# Patient Record
Sex: MALE | Race: WHITE | NOT HISPANIC OR LATINO | URBAN - METROPOLITAN AREA
[De-identification: names, ages, dates, MRNs, and addresses within clinical notes are randomized per-mention and may not be internally consistent; named-entity substitution may affect disease eponyms.]

---

## 2020-12-08 ENCOUNTER — TRANSFERRED RECORDS (OUTPATIENT)
Dept: HEALTH INFORMATION MANAGEMENT | Facility: CLINIC | Age: 30
End: 2020-12-08

## 2020-12-11 ENCOUNTER — TRANSCRIBE ORDERS (OUTPATIENT)
Dept: OTHER | Age: 30
End: 2020-12-11

## 2020-12-11 DIAGNOSIS — H51.9 ABNORMAL EYE MOVEMENTS: Primary | ICD-10-CM

## 2021-01-06 ENCOUNTER — OFFICE VISIT (OUTPATIENT)
Dept: OPHTHALMOLOGY | Facility: CLINIC | Age: 31
End: 2021-01-06
Attending: OPHTHALMOLOGY

## 2021-01-06 DIAGNOSIS — H53.2 DOUBLE VISION: ICD-10-CM

## 2021-01-06 DIAGNOSIS — H51.9 ABNORMAL EYE MOVEMENTS: ICD-10-CM

## 2021-01-06 DIAGNOSIS — H53.10 SUBJECTIVE VISUAL DISTURBANCE: ICD-10-CM

## 2021-01-06 PROCEDURE — 92083 EXTENDED VISUAL FIELD XM: CPT | Performed by: OPHTHALMOLOGY

## 2021-01-06 PROCEDURE — 99204 OFFICE O/P NEW MOD 45 MIN: CPT | Performed by: OPHTHALMOLOGY

## 2021-01-06 PROCEDURE — G0463 HOSPITAL OUTPT CLINIC VISIT: HCPCS | Mod: 25

## 2021-01-06 RX ORDER — CITALOPRAM HYDROBROMIDE 20 MG/1
20 TABLET ORAL
COMMUNITY
Start: 2020-07-10

## 2021-01-06 RX ORDER — LOSARTAN POTASSIUM 100 MG/1
100 TABLET ORAL
COMMUNITY
Start: 2020-12-04 | End: 2021-12-04

## 2021-01-06 RX ORDER — LORATADINE 10 MG/1
10 CAPSULE, LIQUID FILLED ORAL
COMMUNITY

## 2021-01-06 RX ORDER — OMEGA-3 FATTY ACIDS/FISH OIL 300-1000MG
600 CAPSULE ORAL
COMMUNITY

## 2021-01-06 ASSESSMENT — VISUAL ACUITY
CORRECTION_TYPE: CONTACTS
METHOD: SNELLEN - LINEAR
OD_CC: 20/20
OS_CC: 20/20

## 2021-01-06 ASSESSMENT — SLIT LAMP EXAM - LIDS
COMMENTS: NORMAL
COMMENTS: NORMAL

## 2021-01-06 ASSESSMENT — TONOMETRY
OS_IOP_MMHG: 12
OD_IOP_MMHG: 14
IOP_METHOD: ICARE

## 2021-01-06 ASSESSMENT — CUP TO DISC RATIO
OS_RATIO: 0.1
OD_RATIO: 0.1

## 2021-01-06 ASSESSMENT — EXTERNAL EXAM - LEFT EYE: OS_EXAM: NORMAL

## 2021-01-06 ASSESSMENT — EXTERNAL EXAM - RIGHT EYE: OD_EXAM: NORMAL

## 2021-01-06 ASSESSMENT — CONF VISUAL FIELD
OS_NORMAL: 1
OD_NORMAL: 1

## 2021-01-06 NOTE — LETTER
2021    RE: Billy Fontaine  : 1990  MRN: [id number]    Dear Dr. Dickey    Thank you for referring your patient, Billy Fontaine, to my neuro-ophthalmology clinic recently.  After a thorough neuro-ophthalmic history and examination, I came to the following conclusions:     1. Subjective visual disturbance with associated disequilibrium often triggered by artificial lighting.  Today I find no exam correlate to explain his symptoms.  He reports to me that today was a very good day for him and he was not experiencing the visual disturbance.  I have instructed him to return to see me when he is in the midst of a bad episode to see if there are any exam correlates at that time.  Based upon the normal examination and episodic nature triggered by artificial lighting I am most suspicious for vestibular migraines without headache as the etiology however the challenging aspect of this diagnosis is this is a clinical diagnosis with no real confirmatory test in the patient does not have a personal or family history of migraine headache disorder.      The patient reports to me that he has seen a neurologist in the past who told him similarly that he felt the patient had vestibular migraines.  He recommended topiramate although the patient is not certain of the dose.  The patient decided not to take the medication because he was concerned about side effects.  I encouraged the patient to trial topiramate.  I personally would start the patient on 25 mg twice a day but if he tolerated it and symptoms persisted I would not hesitate to increase to 50 mg twice a day.    Patient will follow up with me in the midst of a symptomatic episode    Pt. States he notes in March that he has hard a hard time focusing at distance he hasn't been able to work since May that occurs intermittently. He says that when he walks into a store with bright lighting that he gets really lightheaded and dizzy, he feels that he will fall over,  "this started in May as well. Using sunglasses helped his symptoms. He has been wearing prism to his glasses since July, this has helped some of his symptoms. He says that when he is driving he constantly needs to be moving his eyes around otherwise his eyes will get 'fixed' on one spot. He notes that when he wants to look at a target it takes his eyes some times to reach the target.    Patient was prescribed prism glasses by Dr. Hernandez.      Patient states that artificial lights seems to trigger events.    Patient has no history of headaches. Not prone to motion sickness.      Patient states that his symptoms are highly episodic. He felt normal over last 2 days and driving but other times feels \"awful for days.\"     The patient is presenting with an acute illness that potentially poses a threat to bodily function (vision).    PMH: HTN  POH: none  FH:none- no family history of migraines.   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    Meds: citalopram, propranolol and losartan- these were started back in May 2020.  Patient reports his symptoms started before going on these medications.    The patient has normal afferent visual function in both eyes including normal best corrected visual acuity, color vision, confrontation visual fields, and pupillary light responses in both eyes.  his structural eye exam including slit lamp exam and dilated fundus exam was normal in both eyes. Cranial nerves V1-3, 7,8,9,11, and 12 were normal bilaterally.     Sensorimotor testing performed today showed a relatively comitant 2-4 prism diopter exophoria in all gaze positions at distance and that remained 4 prism diopters at near.  There was no nystagmus in any gaze position.  Saccades were normal and smooth pursuit was normal.  unable to elicit nystagmus with provocative maneuvers including vigorous head shaking, humming, and forceful Valsalva.    MRI brain WO contrast (10/12/2020)  IMPRESSION:  1.  No mass, hemorrhage, " or recent infarct identified.  2.  Mild inflammatory changes of the paranasal sinuses    I obtained and reviewed these images today and agree with the interpretation.  Specifically I did not see any brainstem or cerebellar pathology.  Likewise I did not see any indication of demyelinating disease on FLAIR sequences.    MR cervical spine (8/7/2020):  IMPRESSION:  1.  Mild disc degeneration.  2.  Uncinate spurring causes moderate narrowing of the left neural foramen at C4-C5. This may be affecting the C5 nerve root.  3.  No central spinal stenosis.    Normal head CT scan 5/31/2020    Testing performed today included the following:    Automated kinetic visual fields which were full and reliable in both eyes.        Again, thank you for trusting me with the care of your patient.  For further exam details, please feel free to contact our office for additional records.  If you wish to contact me regarding this patient please email me at [facility] [email or web address] or give my clinic a call to arrange a phone conversation.    Sincerely,    Brenton Rebolledo MD  , Neuro-Ophthalmology and Adult Strabismus Surgery  [facility] in Neuro-Ophthalmology  Department of Ophthalmology and Visual Neurosciences  [facility]    DX: possible vestibular migraine without headache

## 2021-01-06 NOTE — PROGRESS NOTES
1. Subjective visual disturbance with associated disequilibrium often triggered by artificial lighting.  Today I find no exam correlate to explain his symptoms.  He reports to me that today was a very good day for him and he was not experiencing the visual disturbance.  I have instructed him to return to see me when he is in the midst of a bad episode to see if there are any exam correlates at that time.  Based upon the normal examination and episodic nature triggered by artificial lighting I am most suspicious for vestibular migraines without headache as the etiology however the challenging aspect of this diagnosis is this is a clinical diagnosis with no real confirmatory test in the patient does not have a personal or family history of migraine headache disorder.      The patient reports to me that he has seen a neurologist in the past who told him similarly that he felt the patient had vestibular migraines.  He recommended topiramate although the patient is not certain of the dose.  The patient decided not to take the medication because he was concerned about side effects.  I encouraged the patient to trial topiramate.  I personally would start the patient on 25 mg twice a day but if he tolerated it and symptoms persisted I would not hesitate to increase to 50 mg twice a day.    Patient will follow up with me in the midst of a symptomatic episode    Pt. States he notes in March that he has hard a hard time focusing at distance he hasn't been able to work since May that occurs intermittently. He says that when he walks into a store with bright lighting that he gets really lightheaded and dizzy, he feels that he will fall over, this started in May as well. Using sunglasses helped his symptoms. He has been wearing prism to his glasses since July, this has helped some of his symptoms. He says that when he is driving he constantly needs to be moving his eyes around otherwise his eyes will get 'fixed' on one spot.  "He notes that when he wants to look at a target it takes his eyes some times to reach the target.    Patient was prescribed prism glasses by Dr. Hernandez.      Patient states that artificial lights seems to trigger events.    Patient has no history of headaches. Not prone to motion sickness.      Patient states that his symptoms are highly episodic. He felt normal over last 2 days and driving but other times feels \"awful for days.\"     The patient is presenting with an acute illness that potentially poses a threat to bodily function (vision).    PMH: HTN  POH: none  FH:none- no family history of migraines.   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    Meds: citalopram, propranolol and losartan- these were started back in May 2020.  Patient reports his symptoms started before going on these medications.    The patient has normal afferent visual function in both eyes including normal best corrected visual acuity, color vision, confrontation visual fields, and pupillary light responses in both eyes.  his structural eye exam including slit lamp exam and dilated fundus exam was normal in both eyes. Cranial nerves V1-3, 7,8,9,11, and 12 were normal bilaterally.     Sensorimotor testing performed today showed a relatively comitant 2-4 prism diopter exophoria in all gaze positions at distance and that remained 4 prism diopters at near.  There was no nystagmus in any gaze position.  Saccades were normal and smooth pursuit was normal.  unable to elicit nystagmus with provocative maneuvers including vigorous head shaking, humming, and forceful Valsalva.    MRI brain WO contrast (10/12/2020)  IMPRESSION:  1.  No mass, hemorrhage, or recent infarct identified.  2.  Mild inflammatory changes of the paranasal sinuses    I obtained and reviewed these images today and agree with the interpretation.  Specifically I did not see any brainstem or cerebellar pathology.  Likewise I did not see any indication of " demyelinating disease on FLAIR sequences.    MR cervical spine (8/7/2020):  IMPRESSION:  1.  Mild disc degeneration.  2.  Uncinate spurring causes moderate narrowing of the left neural foramen at C4-C5. This may be affecting the C5 nerve root.  3.  No central spinal stenosis.    Normal head CT scan 5/31/2020    Testing performed today included the following:    Automated kinetic visual fields which were full and reliable in both eyes.         Complete documentation of historical and exam elements from today's encounter can be found in the full encounter summary report (not reduplicated in this progress note).  I personally obtained the chief complaint(s) and history of present illness.  I confirmed and edited as necessary the review of systems, past medical/surgical history, family history, social history, and examination findings as documented by others; and I examined the patient myself.  I personally reviewed the relevant tests, images, and reports as documented above.  I formulated and edited as necessary the assessment and plan and discussed the findings and management plan with the patient and family     Brenton Rebolledo MD

## 2021-01-06 NOTE — NURSING NOTE
Chief Complaints and History of Present Illnesses   Patient presents with     Blurred Vision Follow-Up     Chief Complaint(s) and History of Present Illness(es)     Blurred Vision Follow-Up               Comments     Billy Fontaine is a 30 year old male who presents today for    1. Subjective visual disturbance  C/o episodes of vertigo and lightheadedness. Onset march 2020. No precipitating event. Woke up with it one day, and got progressively worse. Wearing sunglasses and that helps the symptoms, but does not alleviate.   When driving, hard to stay focused on driving. Has a hard time moving his eyes at times. When he looks at th rearview mirror, has a hard time shifting eyes back to primary position.       Roseann WHITEHEAD 1:28 PM January 6, 2021

## 2021-01-13 ENCOUNTER — TELEPHONE (OUTPATIENT)
Dept: OPHTHALMOLOGY | Facility: CLINIC | Age: 31
End: 2021-01-13

## 2021-01-13 NOTE — TELEPHONE ENCOUNTER
Called patient back to let him know that Dr. Rebolledo stated that if he can't tolerate the topiramate because of the headaches, the he should stop it. If he can' tolerate it and it helps with his other symptoms, then he should keep taking it. Per Dr. Lucas, he doesn't believe the headaches are dangerous, it's a question of whether the medication helps with his symptoms enough to put up with the headaches.     Patient wants to know if the headaches will eventually go away after using the medication for a period of time. He states that even though he's only been on the medication for almost 1 week, his other symptoms do seem to be getting better. The headaches are tolerable. However, he does not want to have headaches for the rest of his life if he is to take this medication on going      ROBERT MCDANIEL COT on 1/13/2021 at 2:08 PM

## 2021-01-13 NOTE — TELEPHONE ENCOUNTER
Patient called and left a message for a call back.  Returned patient's phone call back. Patient informed me that he has some disability forms for Dr. Rebolledo to fill out since he was the provider who was able to diagnose him. He will fax it over today or tomorrow.     Patient also had a question about the topiramate medication Dr. Rebolledo had recommended him to take. Patient started it on Thursday, 1/7/21 and since taking it, he's had a constant headache all day from the time he wakes up to when he goes to sleep. He also takes citalopram, losartan, and propranolol. He has not had any side effects with those medications. It only started since taking the topiramate. Therefore, patient is wondering if taking the topirmate with the other medications could be causing the headaches or is it the medication itself. Does he need to see his PCP for this?     ROBERT MCDANIEL COT on 1/13/2021 at 1:47 PM

## 2021-01-14 NOTE — TELEPHONE ENCOUNTER
Left message for patient about his question on headaches. I informed him that the the headaches would unlikely be long term but impossible to say with certainty.    ROBERT MCDANIEL COT on 1/14/2021 at 8:20 AM

## 2021-01-19 ENCOUNTER — TELEPHONE (OUTPATIENT)
Dept: OPHTHALMOLOGY | Facility: CLINIC | Age: 31
End: 2021-01-19

## 2021-01-19 NOTE — TELEPHONE ENCOUNTER
Patient called to ask me if he can email over his disbality forms to me. He states he tried to fax it over but it didn't work. I informed patient that the e-mail is not secure. We can have him re-fax it over to me once we verify the fax number, he can mail it to us or drop it off in person. I provided P[facility]'s fax number to patient. He said he will fax it over again and if it doesn't work; he still in and drop it off.     ROBERT MCDANIEL, DARCY on 1/19/2021 at 11:58 AM

## 2021-01-20 ENCOUNTER — TELEPHONE (OUTPATIENT)
Dept: OPHTHALMOLOGY | Facility: CLINIC | Age: 31
End: 2021-01-20

## 2021-01-20 NOTE — TELEPHONE ENCOUNTER
Received a voicemail from patient mom. Patient requested mom to call me to see how she can get the disability form to me because they are having trouble faxing it with the clinic fax number [phone]. Mom requested for either e-mail or an address.  Called mom back to let her know that patient can mail it or drop it off in person. We are not able to e-mail since it is not secure.  Mom will mail it to my attention. I provided her PWB's address.     ROBERT MCDANIEL COT on 1/20/2021 at 3:47 PM